# Patient Record
Sex: FEMALE | Race: WHITE | NOT HISPANIC OR LATINO | Employment: UNEMPLOYED | URBAN - METROPOLITAN AREA
[De-identification: names, ages, dates, MRNs, and addresses within clinical notes are randomized per-mention and may not be internally consistent; named-entity substitution may affect disease eponyms.]

---

## 2023-11-01 ENCOUNTER — APPOINTMENT (OUTPATIENT)
Dept: RADIOLOGY | Facility: HOSPITAL | Age: 28
End: 2023-11-01
Payer: COMMERCIAL

## 2023-11-01 ENCOUNTER — PHARMACY VISIT (OUTPATIENT)
Dept: PHARMACY | Facility: CLINIC | Age: 28
End: 2023-11-01
Payer: MEDICAID

## 2023-11-01 ENCOUNTER — HOSPITAL ENCOUNTER (EMERGENCY)
Facility: HOSPITAL | Age: 28
Discharge: HOME | End: 2023-11-01
Payer: COMMERCIAL

## 2023-11-01 VITALS
RESPIRATION RATE: 14 BRPM | HEART RATE: 70 BPM | OXYGEN SATURATION: 99 % | SYSTOLIC BLOOD PRESSURE: 115 MMHG | BODY MASS INDEX: 41.78 KG/M2 | TEMPERATURE: 98.4 F | HEIGHT: 66 IN | WEIGHT: 260 LBS | DIASTOLIC BLOOD PRESSURE: 66 MMHG

## 2023-11-01 DIAGNOSIS — S82.62XA CLOSED FRACTURE OF DISTAL LATERAL MALLEOLUS OF LEFT FIBULA, INITIAL ENCOUNTER: Primary | ICD-10-CM

## 2023-11-01 PROCEDURE — 2500000001 HC RX 250 WO HCPCS SELF ADMINISTERED DRUGS (ALT 637 FOR MEDICARE OP): Performed by: PHYSICIAN ASSISTANT

## 2023-11-01 PROCEDURE — RXMED WILLOW AMBULATORY MEDICATION CHARGE

## 2023-11-01 PROCEDURE — 73610 X-RAY EXAM OF ANKLE: CPT | Mod: LEFT SIDE | Performed by: RADIOLOGY

## 2023-11-01 PROCEDURE — 73630 X-RAY EXAM OF FOOT: CPT | Mod: LT,FR

## 2023-11-01 PROCEDURE — 73630 X-RAY EXAM OF FOOT: CPT | Mod: LEFT SIDE | Performed by: RADIOLOGY

## 2023-11-01 PROCEDURE — 73590 X-RAY EXAM OF LOWER LEG: CPT | Mod: LEFT SIDE | Performed by: RADIOLOGY

## 2023-11-01 PROCEDURE — 73590 X-RAY EXAM OF LOWER LEG: CPT | Mod: LT,FR

## 2023-11-01 PROCEDURE — 73610 X-RAY EXAM OF ANKLE: CPT | Mod: LT,FR

## 2023-11-01 PROCEDURE — 99284 EMERGENCY DEPT VISIT MOD MDM: CPT | Mod: 25

## 2023-11-01 PROCEDURE — 29515 APPLICATION SHORT LEG SPLINT: CPT | Performed by: PHYSICIAN ASSISTANT

## 2023-11-01 PROCEDURE — 99284 EMERGENCY DEPT VISIT MOD MDM: CPT | Performed by: PHYSICIAN ASSISTANT

## 2023-11-01 PROCEDURE — 29515 APPLICATION SHORT LEG SPLINT: CPT | Mod: LT

## 2023-11-01 RX ORDER — TRAMADOL HYDROCHLORIDE 50 MG/1
50 TABLET ORAL EVERY 8 HOURS PRN
Qty: 9 TABLET | Refills: 0 | Status: SHIPPED | OUTPATIENT
Start: 2023-11-01 | End: 2023-11-04

## 2023-11-01 RX ORDER — IBUPROFEN 600 MG/1
600 TABLET ORAL ONCE
Status: COMPLETED | OUTPATIENT
Start: 2023-11-01 | End: 2023-11-01

## 2023-11-01 RX ORDER — IBUPROFEN 600 MG/1
600 TABLET ORAL EVERY 6 HOURS PRN
Qty: 30 TABLET | Refills: 0 | Status: SHIPPED | OUTPATIENT
Start: 2023-11-01

## 2023-11-01 RX ADMIN — IBUPROFEN 600 MG: 600 TABLET, FILM COATED ORAL at 11:26

## 2023-11-01 ASSESSMENT — COLUMBIA-SUICIDE SEVERITY RATING SCALE - C-SSRS
1. IN THE PAST MONTH, HAVE YOU WISHED YOU WERE DEAD OR WISHED YOU COULD GO TO SLEEP AND NOT WAKE UP?: NO
2. HAVE YOU ACTUALLY HAD ANY THOUGHTS OF KILLING YOURSELF?: NO
6. HAVE YOU EVER DONE ANYTHING, STARTED TO DO ANYTHING, OR PREPARED TO DO ANYTHING TO END YOUR LIFE?: NO

## 2023-11-01 ASSESSMENT — PAIN SCALES - GENERAL: PAINLEVEL_OUTOF10: 7

## 2023-11-01 ASSESSMENT — PAIN - FUNCTIONAL ASSESSMENT: PAIN_FUNCTIONAL_ASSESSMENT: 0-10

## 2023-11-01 NOTE — ED PROVIDER NOTES
HPI:  28-year-old female history of anemia presents complaining of left ankle pain.  States that she stepped on something in her house and twisted her ankle inwards.  States she has pain with putting weight on it.  States this happened half hour prior to arrival.  She has not take anything for the pain.  Denies any weakness or paresthesias.      Physical Exam:   GEN: Vitals noted. NAD  EYES:  EOMs grossly intact, anicteric sclera  CARLEEN: Mucosa moist.  NECK: Supple.  CARD: RRR  PULMONARY: Moving air well. Clear all lung fields.  ABDOMEN: Soft, no guarding, no rigidity. Nontender. NABS  EXTREMITIES: Diffuse tenderness and swelling to the lateral malleolus area, no deformity, no erythema ecchymosis or warmth, 2+ pedal pulses with brisk capillary refill with intact sensation throughout, limited range of motion of the ankle secondary to pain  SKIN: Intact, warm and dry  NEURO: Alert and oriented x 3, speech is clear, no obvious deficits noted.       ----------------------------------------------------------------------------------------------------------------------------    MDM:  28-year-old female presenting for left ankle pain after inversion injury this morning.  On exam she is sitting up comfortably.  Vital signs stable.  She has diffuse swelling with tenderness without deformity or neurovascular compromise.  Will provide ibuprofen and perform plain film x-rays.  Independent x-ray interpretation shows fracture of the distal lateral malleolus, radiology report does confirm subtle avulsion fracture at this area.  She is placed in a short leg splint.  Given crutches.  Given prescriptions for ibuprofen and tramadol.  Order is made for orthopedics follow-up as she was provided forever.  She is provided a work note.  Return precautions were reviewed.  She verbalized understanding results of discharge plan she agreed with plan questions were answered.    XR tibia fibula left 2 views   Final Result   Subtle avulsion fracture  along the distal tip of the lateral   malleolus.   Signed by Fredy Abraham MD      XR foot left 3+ views   Final Result   Subtle avulsion fracture along the distal tip of the lateral   malleolus.   Signed by Fredy Abraham MD      XR ankle left 3+ views   Final Result   Subtle avulsion fracture along the distal tip of the lateral   malleolus.   Signed by Fredy Abraham MD        Labs Reviewed - No data to display  Diagnoses as of 11/01/23 1254   Closed fracture of distal lateral malleolus of left fibula, initial encounter     ----------------------------------------------------------------------------------------------------------------------------    This note was dictated using a speech recognition program.  While an attempt was made at proof reading to minimize errors, minor errors in transcription may be present call for questions.     Be Juares PA-C  11/01/23 1255       Be Juares PA-C  11/01/23 1404

## 2023-11-01 NOTE — ED PROCEDURE NOTE
Procedure  Splint Application    Performed by: Be Juares PA-C  Authorized by: Be Juares PA-C    Consent:     Consent obtained:  Verbal    Consent given by:  Patient    Risks discussed:  Discoloration, numbness, pain and swelling    Alternatives discussed:  No treatment  Universal protocol:     Procedure explained and questions answered to patient or proxy's satisfaction: yes      Imaging studies available: yes      Patient identity confirmed:  Verbally with patient  Pre-procedure details:     Distal neurologic exam:  Normal    Distal perfusion: distal pulses strong and brisk capillary refill    Procedure details:     Location:  Ankle    Ankle location:  L ankle    Strapping: no      Cast type:  Short leg    Attestation: Splint applied and adjusted personally by me    Post-procedure details:     Distal neurologic exam:  Normal    Distal perfusion: distal pulses strong and brisk capillary refill      Procedure completion:  Tolerated    Post-procedure imaging: not applicable                 Be Juares PA-C  11/01/23 1257

## 2023-11-01 NOTE — Clinical Note
Chloe Esposito was seen and treated in our emergency department on 11/1/2023.  She may return to work on 11/05/2023.  Please allow modified work environment for her ankle fracture.     If you have any questions or concerns, please don't hesitate to call.      Be Juares PA-C

## 2023-11-01 NOTE — DISCHARGE INSTRUCTIONS
Follow-up with the orthopedist as soon as you can for your ankle fracture.  Call [phone] to schedule appointment.  Keep the splint dry.  You can bear weight as tolerated on your ankle.  Take the ibuprofen for pain, take Tylenol as well, take the tramadol only for severe pain, do not drive or operate heavy machinery while you are taking it.

## 2023-11-13 ENCOUNTER — HOSPITAL ENCOUNTER (EMERGENCY)
Facility: HOSPITAL | Age: 28
Discharge: HOME | End: 2023-11-13
Attending: EMERGENCY MEDICINE
Payer: COMMERCIAL

## 2023-11-13 VITALS
SYSTOLIC BLOOD PRESSURE: 128 MMHG | OXYGEN SATURATION: 95 % | BODY MASS INDEX: 41.81 KG/M2 | TEMPERATURE: 98 F | HEART RATE: 89 BPM | WEIGHT: 260.14 LBS | RESPIRATION RATE: 16 BRPM | HEIGHT: 66 IN | DIASTOLIC BLOOD PRESSURE: 78 MMHG

## 2023-11-13 DIAGNOSIS — S82.892D CLOSED FRACTURE OF LEFT ANKLE WITH ROUTINE HEALING, SUBSEQUENT ENCOUNTER: Primary | ICD-10-CM

## 2023-11-13 DIAGNOSIS — K08.89 PAIN, DENTAL: ICD-10-CM

## 2023-11-13 LAB
APPEARANCE UR: CLEAR
BILIRUB UR STRIP.AUTO-MCNC: NEGATIVE MG/DL
COLOR UR: YELLOW
GLUCOSE UR STRIP.AUTO-MCNC: NEGATIVE MG/DL
HOLD SPECIMEN: NORMAL
KETONES UR STRIP.AUTO-MCNC: NEGATIVE MG/DL
LEUKOCYTE ESTERASE UR QL STRIP.AUTO: ABNORMAL
MUCOUS THREADS #/AREA URNS AUTO: NORMAL /LPF
NITRITE UR QL STRIP.AUTO: NEGATIVE
PH UR STRIP.AUTO: 5 [PH]
PREGNANCY TEST URINE, POC: NEGATIVE
PROT UR STRIP.AUTO-MCNC: NEGATIVE MG/DL
RBC # UR STRIP.AUTO: NEGATIVE /UL
RBC #/AREA URNS AUTO: NORMAL /HPF
SP GR UR STRIP.AUTO: 1.02
SQUAMOUS #/AREA URNS AUTO: NORMAL /HPF
T PALLIDUM AB SER QL: REACTIVE
UROBILINOGEN UR STRIP.AUTO-MCNC: <2 MG/DL
WBC #/AREA URNS AUTO: NORMAL /HPF

## 2023-11-13 PROCEDURE — 2500000004 HC RX 250 GENERAL PHARMACY W/ HCPCS (ALT 636 FOR OP/ED): Mod: SE | Performed by: STUDENT IN AN ORGANIZED HEALTH CARE EDUCATION/TRAINING PROGRAM

## 2023-11-13 PROCEDURE — 99284 EMERGENCY DEPT VISIT MOD MDM: CPT | Performed by: EMERGENCY MEDICINE

## 2023-11-13 PROCEDURE — 36415 COLL VENOUS BLD VENIPUNCTURE: CPT | Performed by: STUDENT IN AN ORGANIZED HEALTH CARE EDUCATION/TRAINING PROGRAM

## 2023-11-13 PROCEDURE — 81001 URINALYSIS AUTO W/SCOPE: CPT | Performed by: STUDENT IN AN ORGANIZED HEALTH CARE EDUCATION/TRAINING PROGRAM

## 2023-11-13 PROCEDURE — 81025 URINE PREGNANCY TEST: CPT

## 2023-11-13 PROCEDURE — 96372 THER/PROPH/DIAG INJ SC/IM: CPT | Performed by: EMERGENCY MEDICINE

## 2023-11-13 PROCEDURE — 86318 IA INFECTIOUS AGENT ANTIBODY: CPT | Performed by: STUDENT IN AN ORGANIZED HEALTH CARE EDUCATION/TRAINING PROGRAM

## 2023-11-13 PROCEDURE — 99285 EMERGENCY DEPT VISIT HI MDM: CPT | Performed by: EMERGENCY MEDICINE

## 2023-11-13 PROCEDURE — 86780 TREPONEMA PALLIDUM: CPT | Performed by: STUDENT IN AN ORGANIZED HEALTH CARE EDUCATION/TRAINING PROGRAM

## 2023-11-13 PROCEDURE — 99283 EMERGENCY DEPT VISIT LOW MDM: CPT | Mod: 25 | Performed by: EMERGENCY MEDICINE

## 2023-11-13 RX ORDER — KETOROLAC TROMETHAMINE 15 MG/ML
15 INJECTION, SOLUTION INTRAMUSCULAR; INTRAVENOUS ONCE
Status: COMPLETED | OUTPATIENT
Start: 2023-11-13 | End: 2023-11-13

## 2023-11-13 RX ORDER — CHLORHEXIDINE GLUCONATE ORAL RINSE 1.2 MG/ML
15 SOLUTION DENTAL AS NEEDED
Qty: 120 ML | Refills: 0 | Status: SHIPPED | OUTPATIENT
Start: 2023-11-13 | End: 2023-11-27

## 2023-11-13 RX ADMIN — KETOROLAC TROMETHAMINE 15 MG: 15 INJECTION, SOLUTION INTRAMUSCULAR; INTRAVENOUS at 16:55

## 2023-11-13 ASSESSMENT — COLUMBIA-SUICIDE SEVERITY RATING SCALE - C-SSRS
2. HAVE YOU ACTUALLY HAD ANY THOUGHTS OF KILLING YOURSELF?: NO
6. HAVE YOU EVER DONE ANYTHING, STARTED TO DO ANYTHING, OR PREPARED TO DO ANYTHING TO END YOUR LIFE?: NO
1. IN THE PAST MONTH, HAVE YOU WISHED YOU WERE DEAD OR WISHED YOU COULD GO TO SLEEP AND NOT WAKE UP?: NO

## 2023-11-13 ASSESSMENT — LIFESTYLE VARIABLES
REASON UNABLE TO ASSESS: NO
EVER HAD A DRINK FIRST THING IN THE MORNING TO STEADY YOUR NERVES TO GET RID OF A HANGOVER: NO
HAVE YOU EVER FELT YOU SHOULD CUT DOWN ON YOUR DRINKING: NO
HAVE PEOPLE ANNOYED YOU BY CRITICIZING YOUR DRINKING: NO
EVER FELT BAD OR GUILTY ABOUT YOUR DRINKING: NO

## 2023-11-13 ASSESSMENT — PAIN - FUNCTIONAL ASSESSMENT: PAIN_FUNCTIONAL_ASSESSMENT: 0-10

## 2023-11-13 ASSESSMENT — PAIN SCALES - GENERAL: PAINLEVEL_OUTOF10: 9

## 2023-11-13 NOTE — DISCHARGE INSTRUCTIONS
You can call [phone] or go to the walk in clinic below to see a bone doctor. You can use the peridex rinses for your dental pain and to prevent infection. Your UTI seems to have been improving. Please check your MyChart to follow up on your syphillis test.      Orthopedic Injury Clinic  [facility]  [facility]  [address]  [address] Floor, [address]  [place] [address]  [phone]    Office Hours for Adult and Pediatric Patients:  Monday - Friday:  8:30 a.m. - 4 p.m.  Open from 10 a.m. - 2 p.m. Christmas and New Year’s Dominique  Closed on Christmas and New Year’s Day

## 2023-11-13 NOTE — ED TRIAGE NOTES
Patient diagnosed with a R broken ankle on 11/1 and placed in a cast was in the shower and the cast was damaged and is falling off. Patient is also having tooth pain

## 2023-11-13 NOTE — ED PROVIDER NOTES
CC: Dental Pain and Cast came off     HPI:  The patient is a 28-year-old woman presenting to the emergency department for multiple medical complaints.  Patient states that she had an ankle fracture diagnosed several weeks prior and was placed in a temporary cast.  She has been calling trying to get an appointment with orthopedics however has been unable to do so thus is still in a temporary cast.  With her trying to shower, the cast has fallen off.  She is worried that she may have injured it more since then.  She is also concerned about her dental hygiene.  She states that she needs to see a dentist however has not been able to get an appointment with them either.  She is wondering if there is a mouth rinse that could be prescribed for her.  She is also concerned because she was supposed to follow-up with a outpatient clinic today to get retested for syphilis.  She states that she has been on a 14-day course of doxycycline for syphilis and wants to make sure that that is out of her system.  She also wants her urine tested.  She denies any other concerns at this time.    Limitations to History: none  Additional History provided by: N/A    External Records Reviewed:  Recent available ED and inpatient notes reviewed in EMR.  Reviewed outpatient note from 10/30/2023 regarding her STD tests.    PMHx/PSHx:  Per HPI.   - has a past medical history of Acute upper respiratory infection, unspecified (09/27/2019), Encounter for pregnancy test, result positive (06/05/2019), Encounter for screening for malignant neoplasm of cervix (06/05/2019), Encounter for supervision of other normal pregnancy, second trimester (06/12/2019), Personal history of other infectious and parasitic diseases (06/05/2019), and Rash and other nonspecific skin eruption (06/05/2019).  - has no past surgical history on file.    Medications:  Reviewed in EMR. See EMR for complete list of medications and doses.    Allergies:  Patient has no known  allergies.    Social History:  - Tobacco:  has no history on file for tobacco use.   - Alcohol:  has no history on file for alcohol use.   - Illicit Drugs:  has no history on file for drug use.     ROS:  Per HPI.     ???????????????????????????????????????????????????????????????  Triage Vitals:  T 36.7 °C (98 °F)  HR 89  /78  RR 16  O2 95 %      Physical Exam  Vitals and nursing note reviewed.   Constitutional:       Appearance: Normal appearance.   HENT:      Head: Normocephalic.      Mouth/Throat:      Mouth: Mucous membranes are moist.      Dentition: Abnormal dentition. Dental caries present. No dental tenderness, dental abscesses or gum lesions.   Eyes:      Conjunctiva/sclera: Conjunctivae normal.   Cardiovascular:      Rate and Rhythm: Normal rate.   Pulmonary:      Effort: Pulmonary effort is normal.   Abdominal:      General: Abdomen is flat.   Musculoskeletal:      Right ankle: Normal.      Left ankle: Swelling present. No deformity or ecchymosis. Tenderness present over the lateral malleolus. Normal pulse.   Neurological:      Mental Status: She is alert and oriented to person, place, and time.   Psychiatric:         Mood and Affect: Mood normal.       ???????????????????????????????????????????????????????????????  ED Course:  Diagnoses as of 11/13/23 1734   Pain, dental   Closed fracture of left ankle with routine healing, subsequent encounter       EKG & Images:  Independently reviewed, See ED Course      MDM:  -The patient is a 28-year-old woman presenting due to multiple concerns.  She had a temporary splint placed about 14 days ago for a fracture of her left lateral malleolus and has not seen orthopedics since.  On review of the image, she does have a small fracture there.  She does have some dependent edema on that extremity however pulses are intact and no obvious signs of new injury.  As such, patient will be placed in a Aircast splint as this may be easier to bathe with and she was  given better follow-up instructions to call orthopedics at [phone] or to follow-up at the walk-in daily injury clinic at [facility].  Regarding her teeth, she has multiple dental cavities however no signs of infection with minimal tenderness surrounding the area.  As such, she will be prescribed Peridex rinses and instructed to follow-up at one of the walk-in dental clinics for further management.  Syphilis screening test with reflex was sent and urinalysis was sent and had slight leuk esterases however no WBCs to be concerning for UTI.  Given this, was stable for discharge with close follow-up.  She is amenable to this plan.  Return precautions were provided.    Final diagnoses:   [K08.89] Pain, dental   [S82.892D] Closed fracture of left ankle with routine healing, subsequent encounter         Social Determinants Limiting Care:  Poor health literacy    Disposition:  Discharge    Yamila Marino MD   Emergency Medicine Resident, PGY3  [facility]     Disclaimer: This note was dictated by speech recognition. Minor errors in transcription may be present    Procedures ? [employer] last updated 11/13/2023 5:34 PM        Yamila Marino MD  Resident  11/13/23 4046

## 2023-11-14 LAB
RPR SER QL: REACTIVE
RPR SER-TITR: ABNORMAL {TITER}

## 2023-11-15 ENCOUNTER — DOCUMENTATION (OUTPATIENT)
Dept: HOME HEALTH SERVICES | Age: 28
End: 2023-11-15
Payer: COMMERCIAL
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
## 2023-11-16 ENCOUNTER — OFFICE VISIT (OUTPATIENT)
Dept: ORTHOPEDIC SURGERY | Facility: HOSPITAL | Age: 28
End: 2023-11-16
Payer: COMMERCIAL

## 2023-11-16 VITALS — BODY MASS INDEX: 41.78 KG/M2 | HEIGHT: 66 IN | WEIGHT: 260 LBS

## 2023-11-16 DIAGNOSIS — S93.402A MODERATE LEFT ANKLE SPRAIN, INITIAL ENCOUNTER: ICD-10-CM

## 2023-11-16 DIAGNOSIS — S82.892A AVULSION FRACTURE OF ANKLE, LEFT, CLOSED, INITIAL ENCOUNTER: Primary | ICD-10-CM

## 2023-11-16 PROCEDURE — 99203 OFFICE O/P NEW LOW 30 MIN: CPT | Performed by: ORTHOPAEDIC SURGERY

## 2023-11-16 PROCEDURE — L4361 PNEUMA/VAC WALK BOOT PRE OTS: HCPCS | Performed by: ORTHOPAEDIC SURGERY

## 2023-11-16 PROCEDURE — 99213 OFFICE O/P EST LOW 20 MIN: CPT | Mod: 25 | Performed by: ORTHOPAEDIC SURGERY

## 2023-11-16 ASSESSMENT — PAIN - FUNCTIONAL ASSESSMENT: PAIN_FUNCTIONAL_ASSESSMENT: 0-10

## 2023-11-16 ASSESSMENT — PAIN DESCRIPTION - DESCRIPTORS: DESCRIPTORS: NUMBNESS

## 2023-11-16 ASSESSMENT — PAIN SCALES - GENERAL: PAINLEVEL_OUTOF10: 10 - WORST POSSIBLE PAIN

## 2023-11-16 NOTE — PROGRESS NOTES
28-year-old is seen for her left ankle.  She injured the left ankle November 1, 2023 when she slipped on a piece of plastic.  She is been having persistent moderate throbbing pain on the lateral side ankle.  She went to the emergency room and was given an Aircast.    Pleasant in no acute distress.  Walks an antalgic gait.  There is tenderness along the lateral side of the ankle.  Mild soft tissue swelling.  No calf tenderness.    Multiple x-ray views of the left ankle are personally reviewed and there is a distal fibular avulsion fracture.    A discussion about the distal fibular avulsion was done.  She was placed into a fracture boot.  She can weight-bear as tolerated in the fracture boot that she can use for the next 2 to 3 weeks.  She will perform physical therapy.  She can follow-up if not improving over the next 4 weeks.

## 2023-11-17 ENCOUNTER — APPOINTMENT (OUTPATIENT)
Dept: RADIOLOGY | Facility: HOSPITAL | Age: 28
End: 2023-11-17
Payer: COMMERCIAL

## 2023-11-17 ENCOUNTER — HOSPITAL ENCOUNTER (EMERGENCY)
Facility: HOSPITAL | Age: 28
Discharge: HOME | End: 2023-11-17
Payer: COMMERCIAL

## 2023-11-17 VITALS
SYSTOLIC BLOOD PRESSURE: 131 MMHG | DIASTOLIC BLOOD PRESSURE: 72 MMHG | WEIGHT: 260 LBS | OXYGEN SATURATION: 97 % | BODY MASS INDEX: 41.78 KG/M2 | HEART RATE: 98 BPM | HEIGHT: 66 IN | TEMPERATURE: 97 F | RESPIRATION RATE: 18 BRPM

## 2023-11-17 DIAGNOSIS — A53.9 SYPHILIS: Primary | ICD-10-CM

## 2023-11-17 DIAGNOSIS — R51.9 FACIAL PAIN: ICD-10-CM
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
## 2023-11-17 PROCEDURE — 70486 CT MAXILLOFACIAL W/O DYE: CPT

## 2023-11-17 PROCEDURE — 99284 EMERGENCY DEPT VISIT MOD MDM: CPT | Mod: 25

## 2023-11-17 PROCEDURE — 76376 3D RENDER W/INTRP POSTPROCES: CPT

## 2023-11-17 PROCEDURE — 2500000004 HC RX 250 GENERAL PHARMACY W/ HCPCS (ALT 636 FOR OP/ED): Mod: JZ,SE

## 2023-11-17 PROCEDURE — 70486 CT MAXILLOFACIAL W/O DYE: CPT | Performed by: RADIOLOGY

## 2023-11-17 PROCEDURE — 99284 EMERGENCY DEPT VISIT MOD MDM: CPT

## 2023-11-17 PROCEDURE — 99285 EMERGENCY DEPT VISIT HI MDM: CPT | Mod: 25

## 2023-11-17 PROCEDURE — 96372 THER/PROPH/DIAG INJ SC/IM: CPT

## 2023-11-17 RX ADMIN — PENICILLIN G BENZATHINE 2.4 MILLION UNITS: 1200000 INJECTION, SUSPENSION INTRAMUSCULAR at 14:05

## 2023-11-17 RX ADMIN — PENICILLIN G BENZATHINE 2.4 MILLION UNITS: 1200000 INJECTION, SUSPENSION INTRAMUSCULAR at 13:57

## 2023-11-17 ASSESSMENT — COLUMBIA-SUICIDE SEVERITY RATING SCALE - C-SSRS
6. HAVE YOU EVER DONE ANYTHING, STARTED TO DO ANYTHING, OR PREPARED TO DO ANYTHING TO END YOUR LIFE?: NO
1. IN THE PAST MONTH, HAVE YOU WISHED YOU WERE DEAD OR WISHED YOU COULD GO TO SLEEP AND NOT WAKE UP?: NO
2. HAVE YOU ACTUALLY HAD ANY THOUGHTS OF KILLING YOURSELF?: NO

## 2023-11-17 NOTE — Clinical Note
Chloe Esposito was seen and treated in our emergency department on 11/17/2023.  She may return to work on 11/20/2023.       If you have any questions or concerns, please don't hesitate to call.      Marion Hernandez PA-C

## 2023-11-17 NOTE — DISCHARGE INSTRUCTIONS
X-rays of your face are negative.  You need to follow-up with infectious disease for further evaluation of the syphilis and increasing titer.  You can contact them at [phone] or call to schedule an appointment with physician referral line  at [phone] please make an a follow-up appointment within 1 to 2 weeks.  Otherwise, there is no confirmation that you have eradicated the syphilis.
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
## 2024-06-25 ENCOUNTER — LAB (OUTPATIENT)
Dept: LAB | Facility: LAB | Age: 29
End: 2024-06-25
Payer: COMMERCIAL

## 2024-06-25 ENCOUNTER — NUTRITION (OUTPATIENT)
Dept: OBSTETRICS AND GYNECOLOGY | Facility: CLINIC | Age: 29
End: 2024-06-25

## 2024-06-25 ENCOUNTER — OFFICE VISIT (OUTPATIENT)
Dept: OBSTETRICS AND GYNECOLOGY | Facility: CLINIC | Age: 29
End: 2024-06-25
Payer: COMMERCIAL

## 2024-06-25 VITALS
BODY MASS INDEX: 47.09 KG/M2 | WEIGHT: 293 LBS | SYSTOLIC BLOOD PRESSURE: 132 MMHG | HEIGHT: 66 IN | HEART RATE: 104 BPM | DIASTOLIC BLOOD PRESSURE: 84 MMHG

## 2024-06-25 DIAGNOSIS — Z01.419 WELL WOMAN EXAM WITH ROUTINE GYNECOLOGICAL EXAM: ICD-10-CM

## 2024-06-25 DIAGNOSIS — Z11.3 SCREENING EXAMINATION FOR STI: ICD-10-CM

## 2024-06-25 DIAGNOSIS — N93.9 ABNORMAL UTERINE BLEEDING (AUB): ICD-10-CM

## 2024-06-25 DIAGNOSIS — Z12.4 CERVICAL CANCER SCREENING: ICD-10-CM

## 2024-06-25 DIAGNOSIS — Z32.02 PREGNANCY TEST NEGATIVE: Primary | ICD-10-CM

## 2024-06-25 LAB
25(OH)D3 SERPL-MCNC: 21 NG/ML (ref 30–100)
DHEA-S SERPL-MCNC: 156 UG/DL (ref 65–395)
ERYTHROCYTE [DISTWIDTH] IN BLOOD BY AUTOMATED COUNT: 13.8 % (ref 11.5–14.5)
EST. AVERAGE GLUCOSE BLD GHB EST-MCNC: 117 MG/DL
FSH SERPL-ACNC: 6.8 IU/L
HBA1C MFR BLD: 5.7 %
HBV SURFACE AG SERPL QL IA: NONREACTIVE
HCT VFR BLD AUTO: 40.5 % (ref 36–46)
HCV AB SER QL: NONREACTIVE
HGB BLD-MCNC: 12.8 G/DL (ref 12–16)
HIV 1+2 AB+HIV1 P24 AG SERPL QL IA: NONREACTIVE
LH SERPL-ACNC: 9.1 IU/L
MCH RBC QN AUTO: 28.1 PG (ref 26–34)
MCHC RBC AUTO-ENTMCNC: 31.6 G/DL (ref 32–36)
MCV RBC AUTO: 89 FL (ref 80–100)
NRBC BLD-RTO: 0 /100 WBCS (ref 0–0)
PLATELET # BLD AUTO: 403 X10*3/UL (ref 150–450)
PREGNANCY TEST URINE, POC: NEGATIVE
PROLACTIN SERPL-MCNC: 4.4 UG/L (ref 3–20)
RBC # BLD AUTO: 4.55 X10*6/UL (ref 4–5.2)
REFLEX ADDED, ANEMIA PANEL: NORMAL
TREPONEMA PALLIDUM IGG+IGM AB [PRESENCE] IN SERUM OR PLASMA BY IMMUNOASSAY: REACTIVE
TSH SERPL-ACNC: 1.28 MIU/L (ref 0.44–3.98)
WBC # BLD AUTO: 10.1 X10*3/UL (ref 4.4–11.3)

## 2024-06-25 PROCEDURE — 84443 ASSAY THYROID STIM HORMONE: CPT

## 2024-06-25 PROCEDURE — 84146 ASSAY OF PROLACTIN: CPT

## 2024-06-25 PROCEDURE — 82627 DEHYDROEPIANDROSTERONE: CPT

## 2024-06-25 PROCEDURE — 82306 VITAMIN D 25 HYDROXY: CPT

## 2024-06-25 PROCEDURE — 99385 PREV VISIT NEW AGE 18-39: CPT | Mod: GC

## 2024-06-25 PROCEDURE — 84402 ASSAY OF FREE TESTOSTERONE: CPT

## 2024-06-25 PROCEDURE — 36415 COLL VENOUS BLD VENIPUNCTURE: CPT

## 2024-06-25 PROCEDURE — 99385 PREV VISIT NEW AGE 18-39: CPT | Performed by: OBSTETRICS & GYNECOLOGY

## 2024-06-25 PROCEDURE — 87491 CHLMYD TRACH DNA AMP PROBE: CPT

## 2024-06-25 PROCEDURE — 99213 OFFICE O/P EST LOW 20 MIN: CPT | Mod: GC

## 2024-06-25 PROCEDURE — 85027 COMPLETE CBC AUTOMATED: CPT

## 2024-06-25 PROCEDURE — 4004F PT TOBACCO SCREEN RCVD TLK: CPT | Performed by: OBSTETRICS & GYNECOLOGY

## 2024-06-25 PROCEDURE — 83036 HEMOGLOBIN GLYCOSYLATED A1C: CPT

## 2024-06-25 PROCEDURE — 86780 TREPONEMA PALLIDUM: CPT

## 2024-06-25 PROCEDURE — 87389 HIV-1 AG W/HIV-1&-2 AB AG IA: CPT

## 2024-06-25 PROCEDURE — 86318 IA INFECTIOUS AGENT ANTIBODY: CPT

## 2024-06-25 PROCEDURE — 83002 ASSAY OF GONADOTROPIN (LH): CPT

## 2024-06-25 PROCEDURE — 83001 ASSAY OF GONADOTROPIN (FSH): CPT

## 2024-06-25 PROCEDURE — 99203 OFFICE O/P NEW LOW 30 MIN: CPT | Performed by: OBSTETRICS & GYNECOLOGY

## 2024-06-25 PROCEDURE — 87661 TRICHOMONAS VAGINALIS AMPLIF: CPT

## 2024-06-25 PROCEDURE — 86803 HEPATITIS C AB TEST: CPT

## 2024-06-25 PROCEDURE — 81025 URINE PREGNANCY TEST: CPT

## 2024-06-25 PROCEDURE — 83498 ASY HYDROXYPROGESTERONE 17-D: CPT

## 2024-06-25 PROCEDURE — 87340 HEPATITIS B SURFACE AG IA: CPT

## 2024-06-25 ASSESSMENT — PATIENT HEALTH QUESTIONNAIRE - PHQ9
2. FEELING DOWN, DEPRESSED OR HOPELESS: SEVERAL DAYS
3. TROUBLE FALLING OR STAYING ASLEEP OR SLEEPING TOO MUCH: SEVERAL DAYS
9. THOUGHTS THAT YOU WOULD BE BETTER OFF DEAD, OR OF HURTING YOURSELF: NOT AT ALL
SUM OF ALL RESPONSES TO PHQ9 QUESTIONS 1 AND 2: 3
4. FEELING TIRED OR HAVING LITTLE ENERGY: MORE THAN HALF THE DAYS
1. LITTLE INTEREST OR PLEASURE IN DOING THINGS: MORE THAN HALF THE DAYS
6. FEELING BAD ABOUT YOURSELF - OR THAT YOU ARE A FAILURE OR HAVE LET YOURSELF OR YOUR FAMILY DOWN: SEVERAL DAYS
7. TROUBLE CONCENTRATING ON THINGS, SUCH AS READING THE NEWSPAPER OR WATCHING TELEVISION: SEVERAL DAYS
10. IF YOU CHECKED OFF ANY PROBLEMS, HOW DIFFICULT HAVE THESE PROBLEMS MADE IT FOR YOU TO DO YOUR WORK, TAKE CARE OF THINGS AT HOME, OR GET ALONG WITH OTHER PEOPLE: NOT DIFFICULT AT ALL
8. MOVING OR SPEAKING SO SLOWLY THAT OTHER PEOPLE COULD HAVE NOTICED. OR THE OPPOSITE, BEING SO FIGETY OR RESTLESS THAT YOU HAVE BEEN MOVING AROUND A LOT MORE THAN USUAL: NOT AT ALL
SUM OF ALL RESPONSES TO PHQ QUESTIONS 1-9: 11
5. POOR APPETITE OR OVEREATING: NEARLY EVERY DAY

## 2024-06-25 ASSESSMENT — ENCOUNTER SYMPTOMS
CONSTITUTIONAL NEGATIVE: 0
CARDIOVASCULAR NEGATIVE: 0
ENDOCRINE NEGATIVE: 0
PSYCHIATRIC NEGATIVE: 0
NEUROLOGICAL NEGATIVE: 0
RESPIRATORY NEGATIVE: 0
EYES NEGATIVE: 0
HEMATOLOGIC/LYMPHATIC NEGATIVE: 0
ALLERGIC/IMMUNOLOGIC NEGATIVE: 0
MUSCULOSKELETAL NEGATIVE: 0
GASTROINTESTINAL NEGATIVE: 0

## 2024-06-25 ASSESSMENT — PAIN SCALES - GENERAL: PAINLEVEL: 0-NO PAIN

## 2024-06-25 NOTE — PROGRESS NOTES
Subjective   Patient ID: Chloe Esposito is a 29 y.o.  with a history of treated syphilis (10/2023), intermittent pelvic pain, and irregular periods who presents for annual exam and to establish gynecologic care.   Last LMP approximately two months ago, but unknown exact date (around ). Requesting STI blood and urine testing today.  Last Pap 2019 was normal, no h/o abnormal Pap. Due for Pap today.    Reports history of ovarian cyst, unsure timeline.  Plans to see counselor to talk about mental health concerns and weight gain.  No current PCP.      HPI  Reports increase in weight  Reports foul order from skin  Reports foul odor from vagina  Reports shortness of breath with and without exertion   Reports left sided pelvic pain at random.  Reports back pain.    Additional history in gyn visit tab.    Current Outpatient Medications   Medication Instructions    ibuprofen 600 mg, oral, Every 6 hours PRN          Review of Systems    Objective   Physical Exam  Constitutional:       Appearance: Normal appearance. She is obese.   HENT:      Head: Normocephalic.   Cardiovascular:      Rate and Rhythm: Normal rate.   Pulmonary:      Effort: Pulmonary effort is normal.   Abdominal:      Palpations: Abdomen is soft.   Musculoskeletal:      Cervical back: Neck supple.   Skin:     General: Skin is warm.   Neurological:      General: No focal deficit present.      Mental Status: She is alert and oriented to person, place, and time.       Assessment/Plan   29 y.o.  here for routine cervical cancer screening and STI screening. Currently experiencing food insecurity and concerned about weight, which has been increasing over the last few years.      Plan:  - Nutritionist referral  - PCP referral  - U/s for assessment of structural causes of AUB  - Refill ibuprofen    Return after labs and ultrasound.  Return in 4 weeks         Violet Virgen MD 24 1:25 PM

## 2024-06-26 LAB
RPR SER QL: REACTIVE
RPR SER-TITR: ABNORMAL {TITER}

## 2024-06-27 LAB
C TRACH RRNA SPEC QL NAA+PROBE: NEGATIVE
N GONORRHOEA DNA SPEC QL PROBE+SIG AMP: NEGATIVE
T VAGINALIS RRNA SPEC QL NAA+PROBE: NEGATIVE

## 2024-06-29 LAB — 17OHP SERPL-MCNC: 58.12 NG/DL

## 2024-07-01 ENCOUNTER — DOCUMENTATION (OUTPATIENT)
Dept: OBSTETRICS AND GYNECOLOGY | Facility: HOSPITAL | Age: 29
End: 2024-07-01
Payer: COMMERCIAL

## 2024-07-02 LAB
TESTOSTERONE FREE (CHAN): 12.2 PG/ML (ref 0.1–6.4)
TESTOSTERONE,TOTAL,LC-MS/MS: 78 NG/DL (ref 2–45)

## 2024-07-03 ENCOUNTER — APPOINTMENT (OUTPATIENT)
Dept: OBSTETRICS AND GYNECOLOGY | Facility: CLINIC | Age: 29
End: 2024-07-03
Payer: COMMERCIAL

## 2024-07-03 LAB
CYTOLOGY CMNT CVX/VAG CYTO-IMP: NORMAL
HPV HR 12 DNA GENITAL QL NAA+PROBE: POSITIVE
HPV HR GENOTYPES PNL CVX NAA+PROBE: POSITIVE
HPV16 DNA SPEC QL NAA+PROBE: NEGATIVE
HPV18 DNA SPEC QL NAA+PROBE: NEGATIVE
LAB AP HPV GENOTYPE QUESTION: YES
LAB AP HPV HR: NORMAL
LAB AP PAP ADDITIONAL TESTS: NORMAL
LABORATORY COMMENT REPORT: NORMAL
LMP START DATE: NORMAL
MENSTRUAL HX REPORTED: NORMAL
PATH REPORT.TOTAL CANCER: NORMAL

## 2024-07-10 ENCOUNTER — HOSPITAL ENCOUNTER (OUTPATIENT)
Dept: RADIOLOGY | Facility: CLINIC | Age: 29
Discharge: HOME | End: 2024-07-10
Payer: COMMERCIAL

## 2024-07-10 DIAGNOSIS — N93.9 ABNORMAL UTERINE BLEEDING (AUB): ICD-10-CM

## 2024-07-10 PROCEDURE — 76830 TRANSVAGINAL US NON-OB: CPT | Performed by: RADIOLOGY

## 2024-07-10 PROCEDURE — 76856 US EXAM PELVIC COMPLETE: CPT

## 2024-07-10 PROCEDURE — 76856 US EXAM PELVIC COMPLETE: CPT | Performed by: RADIOLOGY

## 2024-07-17 PROBLEM — N93.9 ABNORMAL UTERINE BLEEDING: Status: ACTIVE | Noted: 2024-07-17

## 2024-07-17 PROBLEM — R10.2 PELVIC PAIN: Status: ACTIVE | Noted: 2024-07-17

## 2024-07-17 PROBLEM — E55.9 VITAMIN D DEFICIENCY: Status: ACTIVE | Noted: 2024-07-17

## 2024-07-17 PROBLEM — N83.209 OVARIAN CYST: Status: ACTIVE | Noted: 2024-07-17

## 2024-07-29 ENCOUNTER — PHARMACY VISIT (OUTPATIENT)
Dept: PHARMACY | Facility: CLINIC | Age: 29
End: 2024-07-29
Payer: MEDICAID

## 2024-07-29 ENCOUNTER — OFFICE VISIT (OUTPATIENT)
Dept: OBSTETRICS AND GYNECOLOGY | Facility: CLINIC | Age: 29
End: 2024-07-29
Payer: COMMERCIAL

## 2024-07-29 VITALS
BODY MASS INDEX: 57.78 KG/M2 | HEART RATE: 76 BPM | WEIGHT: 293 LBS | SYSTOLIC BLOOD PRESSURE: 120 MMHG | DIASTOLIC BLOOD PRESSURE: 80 MMHG

## 2024-07-29 DIAGNOSIS — R52 POSTPARTUM PAIN (HHS-HCC): ICD-10-CM

## 2024-07-29 DIAGNOSIS — R63.5 WEIGHT GAIN: ICD-10-CM

## 2024-07-29 DIAGNOSIS — R10.2 PELVIC PAIN: ICD-10-CM

## 2024-07-29 DIAGNOSIS — E28.2 PCOS (POLYCYSTIC OVARIAN SYNDROME): ICD-10-CM

## 2024-07-29 DIAGNOSIS — Z72.0 TOBACCO USE: ICD-10-CM

## 2024-07-29 DIAGNOSIS — F17.200 TOBACCO DEPENDENCY: ICD-10-CM

## 2024-07-29 DIAGNOSIS — E28.2 POLYCYSTIC OVARIAN SYNDROME: Primary | ICD-10-CM

## 2024-07-29 PROCEDURE — 99214 OFFICE O/P EST MOD 30 MIN: CPT | Performed by: STUDENT IN AN ORGANIZED HEALTH CARE EDUCATION/TRAINING PROGRAM

## 2024-07-29 PROCEDURE — 99214 OFFICE O/P EST MOD 30 MIN: CPT | Mod: GC | Performed by: STUDENT IN AN ORGANIZED HEALTH CARE EDUCATION/TRAINING PROGRAM

## 2024-07-29 PROCEDURE — 4004F PT TOBACCO SCREEN RCVD TLK: CPT | Performed by: STUDENT IN AN ORGANIZED HEALTH CARE EDUCATION/TRAINING PROGRAM

## 2024-07-29 PROCEDURE — RXMED WILLOW AMBULATORY MEDICATION CHARGE

## 2024-07-29 RX ORDER — METFORMIN HYDROCHLORIDE 500 MG/1
500 TABLET ORAL
Qty: 90 TABLET | Refills: 3 | Status: SHIPPED | OUTPATIENT
Start: 2024-07-29 | End: 2024-07-29

## 2024-07-29 RX ORDER — IBUPROFEN 600 MG/1
600 TABLET ORAL EVERY 6 HOURS PRN
Qty: 120 TABLET | Refills: 0 | Status: SHIPPED | OUTPATIENT
Start: 2024-07-29

## 2024-07-29 RX ORDER — MICONAZOLE NITRATE 2 %
2 CREAM (GRAM) TOPICAL EVERY 2 HOUR PRN
Qty: 110 EACH | Refills: 3 | Status: SHIPPED | OUTPATIENT
Start: 2024-07-29 | End: 2024-08-28

## 2024-07-29 RX ORDER — CYCLOBENZAPRINE HCL 5 MG
5 TABLET ORAL 3 TIMES DAILY
Qty: 30 TABLET | Refills: 0 | Status: SHIPPED | OUTPATIENT
Start: 2024-07-29 | End: 2024-08-08

## 2024-07-29 RX ORDER — MULTIVITAMIN
1 TABLET ORAL DAILY
Qty: 90 TABLET | Refills: 2 | Status: SHIPPED | OUTPATIENT
Start: 2024-07-29

## 2024-07-29 RX ORDER — METFORMIN HYDROCHLORIDE 500 MG/1
500 TABLET ORAL
Qty: 90 TABLET | Refills: 3 | Status: SHIPPED | OUTPATIENT
Start: 2024-07-29 | End: 2025-01-25

## 2024-07-29 NOTE — PROGRESS NOTES
I saw and evaluated the patient. I personally obtained the key and critical portions of the history and physical exam or was physically present for key and critical portions performed by the resident/fellow. I reviewed the resident/fellow's documentation and discussed the patient with the resident/fellow. I agree with the resident/fellow's medical decision making as documented in the note.    Plan to start POPs and metformin for PCOS. Not an estrogen candidate, declines IUD.     Meme Liriano MD

## 2024-07-29 NOTE — ASSESSMENT & PLAN NOTE
- discussed weight gain and unopposed estrogen risks associated with endometrial hyperplasia and menstrual dysregulation as well as PCOS, patient expressed understanding  - referral placed with integrative medicine for weight loss management  - HbA1C 5.7, prediabetes, rx sent for metformin 500mg BID, both for insulin resistance associated with PCOS as well as for weight loss  - Lipid panel lab added   - FUV in 3 months

## 2024-07-29 NOTE — PROGRESS NOTES
"Gynecology Visit    Chloe Esposito is a 29 y.o.  presenting for follow up visit for AUB with lab results concerning for PCOS with elevated testosterone    HPI  Patient was seen on  to establish gynecologic pain and disclosed pelvic pain, irregular periods, and weight gain over past year.     Pelvic pain and menstrual dysregulation was worked up via PCOS labs and TVUS showing elevated testosterone (78, total), and TVUS showing R ovarian cyst resembling endometrioma vs hemorrhagic cyst. She has been having some intermittent pain on the left side, but nothing that is too bothersome.     Has also gained 100lbs over the past year and was previously connected with nutrition. States she stopped drinking as much and spends a lot of time at home during which she continues to snack.     Pap Hx:  NILM, 24 NILM HPV pos, next 2025    Periods are irregular, lasting 7 days. Usually occurs monthly, but has not had period since April. Using tampons every 30 mins, sometimes with blood clots size of quarter, having pain with periods     Sexual concerns: unsure, will \"get back to me\"    Current contraception: none    STIs: Syphilis, sp treatment with adequate RPR titers        OB History    Para Term  AB Living   2 2 2     2   SAB IAB Ectopic Multiple Live Births           2      # Outcome Date GA Lbr Vipul/2nd Weight Sex Type Anes PTL Lv   2 Term 19    F Vag-Spont EPI N ANJALI   1 Term 13 40w3d 09:05 / 00:33 3.574 kg M Vag-Spont EPI  ANJALI      Complications: Hemorrhage      Obstetric Comments   2013 - D&C following c/s for retained products/hemmorrhage       Past Medical History:   Diagnosis Date    Acquired syphilis 2023    Acute upper respiratory infection, unspecified 2019    Viral upper respiratory tract infection    Encounter for pregnancy test, result positive ([facility]) 2019    Positive urine pregnancy test    Encounter for screening for malignant neoplasm of cervix " 06/05/2019    Screening for cervical cancer    Encounter for supervision of other normal pregnancy, second trimester ([facility]) 06/12/2019    Prenatal care, subsequent pregnancy in second trimester    Obesity     Personal history of other infectious and parasitic diseases 06/05/2019    History of candidal vulvovaginitis    Rash and other nonspecific skin eruption 06/05/2019    Rash       Past Surgical History:   Procedure Laterality Date    DILATION AND CURETTAGE OF UTERUS      2013- retained products of conception       Family History   Problem Relation Name Age of Onset    Other (overdose) Mother      Valvular heart disease Maternal Grandmother      Heart failure Maternal Grandmother         Social History     Socioeconomic History    Marital status: Single     Spouse name: Not on file    Number of children: Not on file    Years of education: Not on file    Highest education level: Not on file   Occupational History    Not on file   Tobacco Use    Smoking status: Every Day     Current packs/day: 0.50     Average packs/day: 0.5 packs/day for 15.6 years (7.8 ttl pk-yrs)     Types: Cigarettes     Start date: 2009    Smokeless tobacco: Not on file    Tobacco comments:     Has tried nicotine patches that helped a little bit.   Vaping Use    Vaping status: Never Used   Substance and Sexual Activity    Alcohol use: Never    Drug use: Never    Sexual activity: Yes     Partners: Male     Birth control/protection: None   Other Topics Concern    Not on file   Social History Narrative    Not on file     Social Determinants of Health     Financial Resource Strain: Not on File (10/30/2023)    Received from [facility]    Financial Resource Strain     Financial Resource Strain: 0   Food Insecurity: Not on File (10/30/2023)    Received from [facility]    Food Insecurity     Food: 0   Transportation Needs: Not on File (10/30/2023)    Received from [facility]    Transportation Needs     Transportation: 0   Physical Activity:  Not on File (10/30/2023)    Received from [facility]    Physical Activity     Physical Activity: 0   Stress: Not on File (10/30/2023)    Received from [facility]    Stress     Stress: 0   Social Connections: Not on File (10/30/2023)    Received from [facility]    Social Connections     Social Connections and Isolation: 0   Intimate Partner Violence: Not on file   Housing Stability: Not on File (10/30/2023)    Received from [facility]    Housing Stability     Housin         Objective   Visit Vitals  /80   Pulse 76         General:   Alert and oriented, in no acute distress   Neck: Supple. No visible thyromegaly.    Breast/Axilla: Normal to palpation bilaterally without masses, skin changes, or nipple discharge.    Abdomen: Soft, non-tender, without masses or organomegaly   Vulva: Normal architecture without erythema, masses, or lesions.    Vagina: Normal mucosa without lesions, masses, or atrophy. No abnormal vaginal discharge.    Cervix: Normal without masses, lesions, or signs of cervicitis.    Uterus: Normal mobile, non-enlarged uterus    Adnexa: Normal without masses or lesions   Pelvic Floor No POP noted. No high tone pelvic floor    Psych Normal affect. Normal mood.      ASSESSEMENT AND PLAN    Weight gain  - discussed weight gain and unopposed estrogen risks associated with endometrial hyperplasia and menstrual dysregulation as well as PCOS, patient expressed understanding  - referral placed with integrative medicine for weight loss management  - HbA1C 5.7, prediabetes, rx sent for metformin 500mg BID, both for insulin resistance associated with PCOS as well as for weight loss  - Lipid panel lab added   - FUV in 3 months    PCOS (polycystic ovarian syndrome)  - Oligomenorrhea, with last period in April, but prior to that, heavy periods  - Testosterone 78  - discussed pathophysiology of PCOS including hormone imbalance, menstrual irregularity, insulin resistance, and unopposed estrogen effects  on uterus, patient expressed understanding  - discussed affect on future pregnancy goals, including possible utilization of letrozole in the future  - due to unopposed estrogen, rx for slynd given for menstrual regularity.   - FUV in 3 months for repeat labs and check in  - hirsuitism, will discuss possible integration of sprinolocatone     Tobacco use  - rx sent for nicotine gum    Pelvic pain  - most likely in setting of ovarian cysts due to intermittent occurrence, differential also includes endometriosis, will continue to monitor   - rx sent for ibuprofen and flexiril      Isatu Ledesma, PGY4 OBGYN  Seen and discussed with Dr. Browne

## 2024-07-29 NOTE — ASSESSMENT & PLAN NOTE
- most likely in setting of ovarian cysts due to intermittent occurrence, differential also includes endometriosis, will continue to monitor   - rx sent for ibuprofen and flexiril

## 2024-07-29 NOTE — ASSESSMENT & PLAN NOTE
- Oligomenorrhea, with last period in April, but prior to that, heavy periods  - Testosterone 78  - discussed pathophysiology of PCOS including hormone imbalance, menstrual irregularity, insulin resistance, and unopposed estrogen effects on uterus, patient expressed understanding  - discussed affect on future pregnancy goals, including possible utilization of letrozole in the future  - due to unopposed estrogen, rx for slynd given for menstrual regularity.   - FUV in 3 months for repeat labs and check in  - hirsuitism, will discuss possible integration of sprinolocatone

## 2024-11-01 ENCOUNTER — OFFICE VISIT (OUTPATIENT)
Dept: OBSTETRICS AND GYNECOLOGY | Facility: CLINIC | Age: 29
End: 2024-11-01
Payer: COMMERCIAL

## 2024-11-01 VITALS
DIASTOLIC BLOOD PRESSURE: 73 MMHG | SYSTOLIC BLOOD PRESSURE: 136 MMHG | HEART RATE: 100 BPM | BODY MASS INDEX: 57.83 KG/M2 | WEIGHT: 293 LBS

## 2024-11-01 DIAGNOSIS — R73.03 PREDIABETES: Primary | ICD-10-CM

## 2024-11-01 DIAGNOSIS — E28.2 POLYCYSTIC OVARIAN SYNDROME: ICD-10-CM

## 2024-11-01 PROCEDURE — 99214 OFFICE O/P EST MOD 30 MIN: CPT | Mod: GC | Performed by: STUDENT IN AN ORGANIZED HEALTH CARE EDUCATION/TRAINING PROGRAM

## 2024-11-01 PROCEDURE — 87086 URINE CULTURE/COLONY COUNT: CPT | Performed by: STUDENT IN AN ORGANIZED HEALTH CARE EDUCATION/TRAINING PROGRAM

## 2024-11-01 PROCEDURE — 99214 OFFICE O/P EST MOD 30 MIN: CPT | Performed by: STUDENT IN AN ORGANIZED HEALTH CARE EDUCATION/TRAINING PROGRAM

## 2024-11-01 PROCEDURE — 4004F PT TOBACCO SCREEN RCVD TLK: CPT | Performed by: STUDENT IN AN ORGANIZED HEALTH CARE EDUCATION/TRAINING PROGRAM

## 2024-11-01 ASSESSMENT — PATIENT HEALTH QUESTIONNAIRE - PHQ9
1. LITTLE INTEREST OR PLEASURE IN DOING THINGS: NOT AT ALL
2. FEELING DOWN, DEPRESSED OR HOPELESS: NOT AT ALL
SUM OF ALL RESPONSES TO PHQ9 QUESTIONS 1 AND 2: 0

## 2024-11-01 ASSESSMENT — ENCOUNTER SYMPTOMS
CONSTITUTIONAL NEGATIVE: 0
CARDIOVASCULAR NEGATIVE: 0
PSYCHIATRIC NEGATIVE: 0
GASTROINTESTINAL NEGATIVE: 0
MUSCULOSKELETAL NEGATIVE: 0
HEMATOLOGIC/LYMPHATIC NEGATIVE: 0
NEUROLOGICAL NEGATIVE: 0
ALLERGIC/IMMUNOLOGIC NEGATIVE: 0
RESPIRATORY NEGATIVE: 0
ENDOCRINE NEGATIVE: 0
EYES NEGATIVE: 0

## 2024-11-07 ENCOUNTER — TELEPHONE (OUTPATIENT)
Dept: OBSTETRICS AND GYNECOLOGY | Facility: CLINIC | Age: 29
End: 2024-11-07
Payer: COMMERCIAL

## 2024-11-07 NOTE — TELEPHONE ENCOUNTER
----- Message from Nurse Dede SIMPSON sent at 11/6/2024  3:10 PM EST -----  Per Dr Hodge Urine cx cancelled. Please call pt, if still having symptoms needs repeat UA with reflex to urine cx. Called pt LVM to call office at [phone]    Called pt states she was seen yesterday because she was sick and everything is fine. She will have HgA1C and Lipids drawn

## 2025-01-23 ENCOUNTER — OFFICE VISIT (OUTPATIENT)
Dept: OBSTETRICS AND GYNECOLOGY | Facility: CLINIC | Age: 30
End: 2025-01-23
Payer: COMMERCIAL

## 2025-01-23 VITALS
WEIGHT: 293 LBS | HEART RATE: 86 BPM | BODY MASS INDEX: 58.07 KG/M2 | DIASTOLIC BLOOD PRESSURE: 82 MMHG | SYSTOLIC BLOOD PRESSURE: 128 MMHG

## 2025-01-23 DIAGNOSIS — N89.8 VAGINAL DISCHARGE: ICD-10-CM

## 2025-01-23 DIAGNOSIS — N93.9 ABNORMAL UTERINE BLEEDING: Primary | ICD-10-CM

## 2025-01-23 PROCEDURE — 87205 SMEAR GRAM STAIN: CPT | Performed by: STUDENT IN AN ORGANIZED HEALTH CARE EDUCATION/TRAINING PROGRAM

## 2025-01-23 RX ORDER — NORETHINDRONE 5 MG/1
5 TABLET ORAL DAILY
Qty: 30 TABLET | Refills: 11 | Status: SHIPPED | OUTPATIENT
Start: 2025-01-23 | End: 2026-01-23

## 2025-01-24 DIAGNOSIS — N76.0 BACTERIAL VAGINOSIS: Primary | ICD-10-CM

## 2025-01-24 DIAGNOSIS — B96.89 BACTERIAL VAGINOSIS: Primary | ICD-10-CM

## 2025-01-24 LAB
CLUE CELLS VAG LPF-#/AREA: ABNORMAL /[LPF]
NUGENT SCORE: 7
YEAST VAG WET PREP-#/AREA: ABNORMAL

## 2025-01-24 RX ORDER — METRONIDAZOLE 500 MG/1
500 TABLET ORAL 2 TIMES DAILY
Qty: 14 TABLET | Refills: 0 | Status: SHIPPED | OUTPATIENT
Start: 2025-01-24 | End: 2025-01-31

## 2025-01-28 ENCOUNTER — TELEPHONE (OUTPATIENT)
Dept: OBSTETRICS AND GYNECOLOGY | Facility: CLINIC | Age: 30
End: 2025-01-28
Payer: COMMERCIAL

## 2025-01-28 NOTE — TELEPHONE ENCOUNTER
TELEPHONE CALL TO PATIENT  Identified by name and date of birth.  Reviewed results and treatment plan  BV, Metronidazole, no ETOH  Patient verbalizes understanding    Requesting Nutrition referral for weight loss - ok per Dr Ledesma

## 2025-01-28 NOTE — TELEPHONE ENCOUNTER
----- Message from Nurse Lisa ALEJO sent at 1/27/2025 10:51 AM EST -----  Regarding: Missed call re:results  Contact: [phone]  Pt calling back, missed call regarding results. [phone]

## 2025-02-11 DIAGNOSIS — B37.31 VAGINAL YEAST INFECTION: Primary | ICD-10-CM

## 2025-02-11 RX ORDER — FLUCONAZOLE 150 MG/1
150 TABLET ORAL ONCE
Qty: 1 TABLET | Refills: 0 | Status: SHIPPED | OUTPATIENT
Start: 2025-02-11 | End: 2025-02-11

## 2025-02-24 NOTE — PROGRESS NOTES
"Nutrition Assessment     Reason for Visit:  Chloe Esposito is a 29 y.o. female seen 6/25/24 for Nutrition consult.    Archived Note    Anthropometrics:  CBW: 355.8 lbs  BMI: 57.43     Food And Nutrient Intake:  Per pt:   Reports weight gain over the past year due to being at home more. States also stopped drinking and is replacing alcohol with food. Pt states \"I'm eating all day.\" Weight loss goal is 100 lbs.    9a/9:30a: up  10/11a: leftovers or fast food- 10pc and large prajapati w/large pop  S: chips, chocolate, ice cream, cookies  Dinner: [employer], restaurant/prepared food more often than home-prepared foods  HS: chips, ice cream    Pt reports eating r/t boredom.     Prefers large portions. Wants to feel after eating.     Arthritis - back locks up, unable to move.    Energy Needs  1850 - 2000 kcal/day for weight loss    Nutrition Diagnosis      Nutrition Diagnosis  Patient has Nutrition Diagnosis: Yes  Diagnosis Status (1): Active  Nutrition Diagnosis 1: Unintended weight gain  Related to (1): intake above needs for weight loss or weight maintenance  As Evidenced by (1): pt reports of significant weight gain in past year      Nutrition Interventions/Recommendations   Use Portion Plate Method. Easy way to increased fiber and vegetables to provide a full feeling w/less calories. Helps reduce portions of higher kcal foods without having to sacrifice them completely all at once.    Nutrition Education Material provided:  Portion Plate Method info sheet      Nutrition Monitoring and Evaluation   Anthropometric measurements  Monitoring and Evaluation Plan: Weight change  Body Weight Change: Body weight loss  Criteria: 0.5 - 2.0 lbs/week is considered healthy weight loss    Telephone follow-up: 7/10/24 @ 2pm   "